# Patient Record
Sex: FEMALE | Race: WHITE
[De-identification: names, ages, dates, MRNs, and addresses within clinical notes are randomized per-mention and may not be internally consistent; named-entity substitution may affect disease eponyms.]

---

## 2020-03-04 ENCOUNTER — HOSPITAL ENCOUNTER (EMERGENCY)
Dept: HOSPITAL 56 - MW.ED | Age: 25
Discharge: HOME | End: 2020-03-04
Payer: COMMERCIAL

## 2020-03-04 DIAGNOSIS — K52.9: Primary | ICD-10-CM

## 2020-03-04 DIAGNOSIS — F17.210: ICD-10-CM

## 2020-03-04 PROCEDURE — 99283 EMERGENCY DEPT VISIT LOW MDM: CPT

## 2020-03-04 NOTE — EDM.PDOC
ED HPI GENERAL MEDICAL PROBLEM





- General


Chief Complaint: Gastrointestinal Problem


Stated Complaint: VOMITTING


Time Seen by Provider: 03/04/20 23:08


Source of Information: Reports: Patient





- History of Present Illness


INITIAL COMMENTS - FREE TEXT/NARRATIVE: 


The patient is a 24-year-old female who presents to the ER for nausea and 

vomiting.  She states that she has been throwing up all day long as well as 

with copious amounts of diarrhea.  It is nonbloody.  No fevers or chills but 

she generally does not feel well.  She does have some prescriptions from her 

previous pregnancy of Zofran tablets, not ODT, that she was unable to keep 

down.  No coughing, no difficulty breathing, no other acute complaints.





  ** abdomina


Pain Score (Numeric/FACES): 8





- Related Data


 Allergies











Allergy/AdvReac Type Severity Reaction Status Date / Time


 


No Known Allergies Allergy   Verified 03/04/20 22:43











Home Meds: 


 Home Meds





Ondansetron [Zofran ODT] 4 mg PO Q4H PRN 5 Days #20 tab.dis 03/04/20 [Rx]











Past Medical History


HEENT History: Reports: None


Cardiovascular History: Reports: None


Respiratory History: Reports: None


Gastrointestinal History: Reports: None


Genitourinary History: Reports: None


OB/GYN History: Reports: Pregnancy


Musculoskeletal History: Reports: None


Neurological History: Reports: None


Psychiatric History: Reports: None


Endocrine/Metabolic History: Reports: None


Hematologic History: Reports: None


Immunologic History: Reports: None


Oncologic (Cancer) History: Reports: None


Dermatologic History: Reports: None





Social & Family History





- Family History


Family Medical History: Noncontributory





- Tobacco Use


Smoking Status *Q: Current Every Day Smoker


Years of Tobacco use: 7


Packs/Tins Daily: 1





- Recreational Drug Use


Recreational Drug Use: No





ED ROS GENERAL





- Review of Systems


Review Of Systems: See Below (Positive for nausea vomiting diarrhea, negative 

for fevers, all other Positives and pertinent negatives as per HPI. All other 

pertinent systems were reviewed and are negative)





ED EXAM, GI/ABD





- Physical Exam


Exam: See Below


Text/Narrative:: 








Constitutional:  No acute distress, Non-toxic appearance, looks like she does 

not feel well. 


HEENT: Normocephalic, Atraumatic, pupils equal round reactive to light, EOMI, 

oropharynx minimally dry


Neck: Normal range of motion, No stridor, trachea midline


Respiratory: No respiratory distress, No tachypnea, lungs are clear


Cardiovascular: Minimally tachycardic


Gastrointestinal: Soft, nontender nondistended


Genital / Urinary:  Deferred


Musculoskeletal: All four extremities present and atraumatic


Back: FROM


Integument: Warm, Dry, Color is ethnicity appropriate, No rash, good skin 

turgor.


Neuro: Alert, Awake, No focal deficits noted


Psych: Affect, Judgement, mood normal 








Course





- Vital Signs


Text/Narrative:: 





History and exam sound like a classic gastroenteritis without any concerning 

symptomology per history or exam (no tenderness, fevers, bloody stools, etc., 

and she does not have any objective findings that would preclude her to need IV 

rehydration.





Furthermore, the patient does have some Zofran tablets at home from a previous 

prescription but they are not ODT tablets.





She is able to keep down fluids so she will be discharged with a prescription 

for Zofran ODT. 








Last Recorded V/S: 





 Last Vital Signs











Temp  35.9 C L  03/04/20 22:43


 


Pulse  107 H  03/04/20 22:43


 


Resp  14   03/04/20 22:43


 


BP  113/62   03/04/20 22:43


 


Pulse Ox  98   03/04/20 22:43














- Orders/Labs/Meds


Meds: 





Medications














Discontinued Medications














Generic Name Dose Route Start Last Admin





  Trade Name Lyla  PRN Reason Stop Dose Admin


 


Ondansetron HCl  4 mg  03/04/20 22:40  03/04/20 22:54





  Zofran Odt  PO  03/04/20 22:41  4 mg





  ONETIME ONE   Administration





     





     





     





     














Departure





- Departure


Time of Disposition: 23:15


Disposition: Home, Self-Care 01


Condition: Good


Clinical Impression: 


 Gastroenteritis








- Discharge Information


Instructions:  Nausea and Vomiting, Adult, Easy-to-Read


Referrals: 


PCP,None [Primary Care Provider] - 





Sepsis Event Note





- Evaluation


Sepsis Screening Result: No Definite Risk





- Focused Exam


Vital Signs: 





 Vital Signs











  Temp Pulse Resp BP Pulse Ox


 


 03/04/20 22:43  35.9 C L  107 H  14  113/62  98











Date Exam was Performed: 03/04/20


Time Exam was Performed: 23:08

## 2025-02-06 ENCOUNTER — HOSPITAL ENCOUNTER (EMERGENCY)
Dept: HOSPITAL 41 - JD.ED | Age: 30
Discharge: HOME | End: 2025-02-06
Payer: MEDICAID

## 2025-02-06 DIAGNOSIS — Z79.899: ICD-10-CM

## 2025-02-06 DIAGNOSIS — Z88.5: ICD-10-CM

## 2025-02-06 DIAGNOSIS — K04.7: Primary | ICD-10-CM
